# Patient Record
Sex: FEMALE | Race: WHITE | NOT HISPANIC OR LATINO | Employment: UNEMPLOYED | URBAN - METROPOLITAN AREA
[De-identification: names, ages, dates, MRNs, and addresses within clinical notes are randomized per-mention and may not be internally consistent; named-entity substitution may affect disease eponyms.]

---

## 2024-08-07 ENCOUNTER — DOCUMENTATION ONLY (OUTPATIENT)
Dept: HEMATOLOGY/ONCOLOGY | Facility: CLINIC | Age: 44
End: 2024-08-07
Payer: MEDICARE

## 2024-08-09 ENCOUNTER — RESEARCH ENCOUNTER (OUTPATIENT)
Dept: HEMATOLOGY/ONCOLOGY | Facility: CLINIC | Age: 44
End: 2024-08-09
Payer: MEDICARE

## 2024-08-09 PROBLEM — C20 PRIMARY MALIGNANT NEOPLASM OF RECTUM: Status: ACTIVE | Noted: 2024-08-09

## 2024-08-14 ENCOUNTER — TUMOR BOARD CONFERENCE (OUTPATIENT)
Dept: HEMATOLOGY/ONCOLOGY | Facility: CLINIC | Age: 44
End: 2024-08-14
Payer: MEDICARE

## 2024-08-14 NOTE — Clinical Note
Logan - please schedule intake with Dr. Flowers and fwd charting to Dr. Em. Screening for Riboscience.

## 2024-08-14 NOTE — PROGRESS NOTES
[facility] Tumor Board    Date: 8/14/2024    Patient Name: Flor Mckeon    MRN: [id number]    Diagnosis: Metastatic CRC, liver, ovarian, lung mets - Diagnosed in 2020.    Referring Provider: Dr. Maria Antonia Nair PCTP Providers:     Dr. Srinath Quinteros, Jodie Gruber, NP, Lyssa Ledesma NP, Dr. Korey Flowers (Dr. Nguyen)    Patient Summary:  Pathology:    Has failed Chemotherapy, Trials  Failed chemotherapy: 1. FOLFOX/Avastin (3/4/20) Underwent LAR and resection of liver mets at [facility] 5/2020. Started adjuvant FOLFOX (6/29/20-10/5/20). Bilateral ovarian mets  > underwent MARCELO BSO 12/11/20. 2. FOLFIRI/Cetuximab (6/30/21) > completed RT+Xeloda 9/2021 at [facility] > FOLFIRI/Cetuximab (10/26/21/12/13/21). 3.  FOLFIRI+Panitumumab (4/11/22) maintenance started 6/13/22 > progression. 4. FOLFOX+Avastin (9/2022-6/2023) progression.  5. Avastin+Lonsurf (6/2023-8/2023) CEA elevated. 7. Lonsurf+Juhi (5/7/24-current) > progression of lung mets on 7/17 scans.  Failed Trials: 6. Clinical trial EMB01 (10/25/23-4/2024) > unenrolled d/t severe skin toxicity.    Current treatment(s):    ECOG: Restricted in physically strenuous activity but ambulatory and able to carry out work of a light or sedentary nature, e.g., light house work, office work    Molecular Workup:    Other  Other Molecular Workup: Oncomine: ARID1A, CDKN2B, FGF3, KNSTRN, NOTCH1, PIK3CA, STK11, TERT, TP53      Board Recommendations:    Standard of care recommendations:     Trial recommendations: Late phase:  DNQ for late phase mCRC trial requiring [id number] mutation.  Early phase: NTX waitlist. Intake for Riboscience trial.   [facility] referral: No.